# Patient Record
Sex: MALE | Race: WHITE | NOT HISPANIC OR LATINO | Employment: FULL TIME | URBAN - METROPOLITAN AREA
[De-identification: names, ages, dates, MRNs, and addresses within clinical notes are randomized per-mention and may not be internally consistent; named-entity substitution may affect disease eponyms.]

---

## 2023-02-24 ENCOUNTER — OFFICE VISIT (OUTPATIENT)
Dept: FAMILY MEDICINE | Facility: CLINIC | Age: 28
End: 2023-02-24
Payer: COMMERCIAL

## 2023-02-24 DIAGNOSIS — Z23 ENCOUNTER FOR IMMUNIZATION: Primary | ICD-10-CM

## 2023-02-24 PROCEDURE — 99207 PR NO CHARGE NURSE ONLY: CPT | Performed by: NURSE PRACTITIONER

## 2023-02-24 PROCEDURE — 90715 TDAP VACCINE 7 YRS/> IM: CPT | Performed by: NURSE PRACTITIONER

## 2023-02-24 PROCEDURE — 90471 IMMUNIZATION ADMIN: CPT | Performed by: NURSE PRACTITIONER

## 2023-02-24 NOTE — PROGRESS NOTES
patient does not want to see the provider today. Patient states has a physical set up in [place]. Only wants a Tdap-given  Answers for HPI/ROS submitted by the patient on 2/24/2023  What is the reason for your visit today? : I need the TDAP vaccine before my baby is born next month  How many servings of fruits and vegetables do you eat daily?: 2-3  On average, how many sweetened beverages do you drink each day (Examples: soda, juice, sweet tea, etc.  Do NOT count diet or artificially sweetened beverages)?: 0  How many minutes a day do you exercise enough to make your heart beat faster?: 20 to 29  How many days a week do you exercise enough to make your heart beat faster?: 4  How many days per week do you miss taking your medication?: 0    Nurse only visit

## 2023-04-20 ENCOUNTER — OFFICE VISIT (OUTPATIENT)
Dept: FAMILY MEDICINE | Facility: CLINIC | Age: 28
End: 2023-04-20
Payer: COMMERCIAL

## 2023-04-20 VITALS
RESPIRATION RATE: 18 BRPM | WEIGHT: 212.6 LBS | TEMPERATURE: 97.6 F | BODY MASS INDEX: 27.28 KG/M2 | SYSTOLIC BLOOD PRESSURE: 122 MMHG | HEIGHT: 74 IN | DIASTOLIC BLOOD PRESSURE: 72 MMHG | OXYGEN SATURATION: 100 % | HEART RATE: 60 BPM

## 2023-04-20 DIAGNOSIS — Z11.59 NEED FOR HEPATITIS C SCREENING TEST: ICD-10-CM

## 2023-04-20 DIAGNOSIS — Z11.4 SCREENING FOR HIV (HUMAN IMMUNODEFICIENCY VIRUS): ICD-10-CM

## 2023-04-20 DIAGNOSIS — Z00.00 ANNUAL PHYSICAL EXAM: Primary | ICD-10-CM

## 2023-04-20 DIAGNOSIS — N50.3 EPIDIDYMAL CYST: ICD-10-CM

## 2023-04-20 PROCEDURE — 99385 PREV VISIT NEW AGE 18-39: CPT | Performed by: FAMILY MEDICINE

## 2023-04-20 ASSESSMENT — ENCOUNTER SYMPTOMS
ABDOMINAL PAIN: 0
COUGH: 0
DYSURIA: 0
FEVER: 0
PARESTHESIAS: 0
CHILLS: 0
HEADACHES: 0
NERVOUS/ANXIOUS: 0
CONSTIPATION: 0
DIARRHEA: 0
ARTHRALGIAS: 0
SHORTNESS OF BREATH: 0
SORE THROAT: 0
HEMATOCHEZIA: 0
JOINT SWELLING: 0
FREQUENCY: 0
DIZZINESS: 0
MYALGIAS: 0
WEAKNESS: 0
EYE PAIN: 0
HEMATURIA: 0
NAUSEA: 0
HEARTBURN: 0
PALPITATIONS: 0

## 2023-04-20 ASSESSMENT — PAIN SCALES - GENERAL: PAINLEVEL: NO PAIN (0)

## 2023-04-20 NOTE — PROGRESS NOTES
SUBJECTIVE:   CC: Jake is an 27 year old who presents for preventative health visit.       4/20/2023    10:56 AM   Additional Questions   Roomed by Ginna OLVERA     Healthy Habits:     Getting at least 3 servings of Calcium per day:  Yes    Bi-annual eye exam:  NO    Dental care twice a year:  NO    Sleep apnea or symptoms of sleep apnea:  None    Diet:  Breakfast skipped    Frequency of exercise:  2-3 days/week    Duration of exercise:  15-30 minutes    Taking medications regularly:  Yes    Medication side effects:  None    PHQ-2 Total Score: 0    Additional concerns today:  No      History of epididymal cyst on the left.  Has been asymptomatic since that area.  Feels like it still there.  Reviewed imaging.        Today's PHQ-2 Score:       4/20/2023    10:26 AM   PHQ-2 ( 1999 [employer])   Q1: Little interest or pleasure in doing things 0   Q2: Feeling down, depressed or hopeless 0   PHQ-2 Score 0   Q1: Little interest or pleasure in doing things Not at all    Not at all   Q2: Feeling down, depressed or hopeless Not at all    Not at all   PHQ-2 Score 0    0       Have you ever done Advance Care Planning? (For example, a Health Directive, POLST, or a discussion with a medical provider or your loved ones about your wishes): No, advance care planning information given to patient to review.  Patient declined advance care planning discussion at this time.    Social History     Tobacco Use     Smoking status: Never     Passive exposure: Never     Smokeless tobacco: Never   Vaping Use     Vaping status: Never Used   Substance Use Topics     Alcohol use: Not on file             4/20/2023    10:26 AM   Alcohol Use   Prescreen: >3 drinks/day or >7 drinks/week? No       Last PSA: No results found for: PSA    Reviewed orders with patient. Reviewed health maintenance and updated orders accordingly -       Reviewed and updated as needed this visit by clinical staff   Tobacco  Allergies  Meds              Reviewed and updated as  "needed this visit by Provider                     Review of Systems   Constitutional: Negative for chills and fever.   HENT: Negative for congestion, ear pain, hearing loss and sore throat.    Eyes: Negative for pain and visual disturbance.   Respiratory: Negative for cough and shortness of breath.    Cardiovascular: Negative for chest pain, palpitations and peripheral edema.   Gastrointestinal: Negative for abdominal pain, constipation, diarrhea, heartburn, hematochezia and nausea.   Genitourinary: Negative for dysuria, frequency, genital sores, hematuria, impotence, penile discharge and urgency.   Musculoskeletal: Negative for arthralgias, joint swelling and myalgias.   Skin: Negative for rash.   Neurological: Negative for dizziness, weakness, headaches and paresthesias.   Psychiatric/Behavioral: Negative for mood changes. The patient is not nervous/anxious.          OBJECTIVE:   /72   Pulse 60   Temp 97.6  F (36.4  C) (Temporal)   Resp 18   Ht 1.887 m (6' 2.3\")   Wt 96.4 kg (212 lb 9.6 oz)   SpO2 100%   BMI 27.08 kg/m      Physical Exam  GENERAL: healthy, alert and no distress  NECK: no adenopathy, no asymmetry, masses, or scars and thyroid normal to palpation  RESP: lungs clear to auscultation - no rales, rhonchi or wheezes  CV: regular rate and rhythm, normal S1 S2, no S3 or S4, no murmur, click or rub, no peripheral edema and peripheral pulses strong  ABDOMEN: soft, nontender, no hepatosplenomegaly, no masses and bowel sounds normal   (male): normal male genitalia without lesions or urethral discharge, no hernia.  He continues to have a round slightly mobile epididymal head cyst on the left testy.  MS: no gross musculoskeletal defects noted, no edema    Diagnostic Test Results:  Labs reviewed in Epic    ASSESSMENT/PLAN:       ICD-10-CM    1. Annual physical exam  Z00.00 Lipid panel reflex to direct LDL Fasting      2. Screening for HIV (human immunodeficiency virus)  Z11.4       3. Need for " "hepatitis C screening test  Z11.59       4. Epididymal cyst  N50.3         Annual topics covered  Reviewed above topics.  Stable cyst.  No further intervention is needed.  Discussed the difference tween cyst and testicular cancer.        COUNSELING:   Reviewed preventive health counseling, as reflected in patient instructions      BMI:   Estimated body mass index is 27.08 kg/m  as calculated from the following:    Height as of this encounter: 1.887 m (6' 2.3\").    Weight as of this encounter: 96.4 kg (212 lb 9.6 oz).         He reports that he has never smoked. He has never been exposed to tobacco smoke. He has never used smokeless tobacco.        Lon Son MD  [facility]  "

## 2023-05-21 ENCOUNTER — HEALTH MAINTENANCE LETTER (OUTPATIENT)
Age: 28
End: 2023-05-21

## 2024-07-28 ENCOUNTER — HEALTH MAINTENANCE LETTER (OUTPATIENT)
Age: 29
End: 2024-07-28

## 2025-01-22 ENCOUNTER — TELEPHONE (OUTPATIENT)
Dept: FAMILY MEDICINE | Facility: CLINIC | Age: 30
End: 2025-01-22
Payer: COMMERCIAL

## 2025-01-22 NOTE — TELEPHONE ENCOUNTER
Reason for Call:  Appointment Request    Patient requesting this type of appt:  Preventive     Requested provider: No Ref-Primary, Physician    Reason patient unable to be scheduled: Not with their preferred provider    When does patient want to be seen/preferred time: Same day    Comments: patient called and is scheduled for Friday to see Adelaida Painter at [facility]  for a physical.    Patient would like to see provider today instead for same reason.    Provider approval needed.    Please contact patient.  Thank you.    Could we send this information to you in [employer] or would you prefer to receive a phone call?:   Patient would prefer a phone call   Okay to leave a detailed message?: Yes at Home number on file [phone] (home)    Call taken on 1/22/2025 at 8:47 AM by Evi Hunt

## 2025-01-22 NOTE — TELEPHONE ENCOUNTER
Called and lvm explaining that we are unfortunately able to accommodate his request for earlier physical today.

## 2025-06-10 ENCOUNTER — OFFICE VISIT (OUTPATIENT)
Dept: FAMILY MEDICINE | Facility: CLINIC | Age: 30
End: 2025-06-10
Payer: COMMERCIAL

## 2025-06-10 VITALS
TEMPERATURE: 98.4 F | HEART RATE: 60 BPM | DIASTOLIC BLOOD PRESSURE: 72 MMHG | HEIGHT: 76 IN | OXYGEN SATURATION: 98 % | BODY MASS INDEX: 26.35 KG/M2 | RESPIRATION RATE: 12 BRPM | WEIGHT: 216.4 LBS | SYSTOLIC BLOOD PRESSURE: 116 MMHG

## 2025-06-10 DIAGNOSIS — R43.0 ANOSMIA: ICD-10-CM

## 2025-06-10 DIAGNOSIS — Z00.00 ANNUAL PHYSICAL EXAM: ICD-10-CM

## 2025-06-10 DIAGNOSIS — J34.2 DEVIATED NASAL SEPTUM: ICD-10-CM

## 2025-06-10 DIAGNOSIS — Z11.59 NEED FOR HEPATITIS C SCREENING TEST: ICD-10-CM

## 2025-06-10 DIAGNOSIS — Z11.4 SCREENING FOR HIV (HUMAN IMMUNODEFICIENCY VIRUS): Primary | ICD-10-CM

## 2025-06-10 LAB
CHOLEST SERPL-MCNC: 180 MG/DL
FASTING STATUS PATIENT QL REPORTED: YES
HDLC SERPL-MCNC: 61 MG/DL
LDLC SERPL CALC-MCNC: 108 MG/DL
NONHDLC SERPL-MCNC: 119 MG/DL
TRIGL SERPL-MCNC: 53 MG/DL

## 2025-06-10 PROCEDURE — 99395 PREV VISIT EST AGE 18-39: CPT | Performed by: FAMILY MEDICINE

## 2025-06-10 PROCEDURE — 3078F DIAST BP <80 MM HG: CPT | Performed by: FAMILY MEDICINE

## 2025-06-10 PROCEDURE — 99213 OFFICE O/P EST LOW 20 MIN: CPT | Mod: 25 | Performed by: FAMILY MEDICINE

## 2025-06-10 PROCEDURE — 3074F SYST BP LT 130 MM HG: CPT | Performed by: FAMILY MEDICINE

## 2025-06-10 PROCEDURE — 80061 LIPID PANEL: CPT | Performed by: FAMILY MEDICINE

## 2025-06-10 PROCEDURE — 36415 COLL VENOUS BLD VENIPUNCTURE: CPT | Performed by: FAMILY MEDICINE

## 2025-06-10 PROCEDURE — 1126F AMNT PAIN NOTED NONE PRSNT: CPT | Performed by: FAMILY MEDICINE

## 2025-06-10 SDOH — HEALTH STABILITY: PHYSICAL HEALTH: ON AVERAGE, HOW MANY DAYS PER WEEK DO YOU ENGAGE IN MODERATE TO STRENUOUS EXERCISE (LIKE A BRISK WALK)?: 3 DAYS

## 2025-06-10 ASSESSMENT — SOCIAL DETERMINANTS OF HEALTH (SDOH): HOW OFTEN DO YOU GET TOGETHER WITH FRIENDS OR RELATIVES?: MORE THAN THREE TIMES A WEEK

## 2025-06-10 ASSESSMENT — PAIN SCALES - GENERAL: PAINLEVEL_OUTOF10: NO PAIN (0)

## 2025-06-10 NOTE — PROGRESS NOTES
Preventive Care Visit  [facility]  Lon Son MD, Family Medicine  Satya 10, 2025  {Provider  Link to SmartSet :618116}    {PROVIDER CHARTING PREFERENCE:121906}    Hayder Joseph is a 29 year old, presenting for the following:  Physical        6/10/2025     3:47 PM   Additional Questions   Roomed by Satish Dean          HPI    Bloody noses  Trouble breathing out of L  Can't  taste or smell well, been present since young  No trauma known          Advance Care Planning  {The storyboard will display whether the patient has ACP docs on file. Hover over the Code section in the storyboard to access the ACP documents. :036968}  Discussed advance care planning with patient; informed AVS has link to [employer].        6/10/2025   General Health   How would you rate your overall physical health? Good   Feel stress (tense, anxious, or unable to sleep) Not at all         6/10/2025   Nutrition   Three or more servings of calcium each day? Yes   Diet: Regular (no restrictions)   How many servings of fruit and vegetables per day? (!) 2-3   How many sweetened beverages each day? 0-1         6/10/2025   Exercise   Days per week of moderate/strenous exercise 3 days         6/10/2025   Social Factors   Frequency of gathering with friends or relatives More than three times a week   Worry food won't last until get money to buy more No   Food not last or not have enough money for food? No   Do you have housing? (Housing is defined as stable permanent housing and does not include staying outside in a car, in a tent, in an abandoned building, in an overnight shelter, or couch-surfing.) Yes   Are you worried about losing your housing? No   Lack of transportation? No   Unable to get utilities (heat,electricity)? No         6/10/2025   Dental   Dentist two times every year? Yes         Today's PHQ-2 Score:       6/10/2025     3:44 PM   PHQ-2 ( 1999 Pf[employer])   Q1: Little interest or pleasure  "in doing things 0   Q2: Feeling down, depressed or hopeless 0   PHQ-2 Score 0    Q1: Little interest or pleasure in doing things Not at all   Q2: Feeling down, depressed or hopeless Not at all   PHQ-2 Score 0       Patient-reported           6/10/2025   Substance Use   Alcohol more than 3/day or more than 7/wk No   Do you use any other substances recreationally? No     Social History     Tobacco Use    Smoking status: Never     Passive exposure: Never    Smokeless tobacco: Never   Vaping Use    Vaping status: Never Used     {Provider  If there are gaps in the social history shown above, please follow the link to update and then refresh the note Link to Social and Substance History :813481}        6/10/2025   One time HIV Screening   Previous HIV test? I don't know         6/10/2025   STI Screening   New sexual partner(s) since last STI/HIV test? No         6/10/2025   Contraception/Family Planning   Questions about contraception or family planning No     {Provider  REQUIRED FOR AWV Use the storyboard to review patient history, after sections have been marked as reviewed, refresh note to capture documentation:444259}   Reviewed and updated as needed this visit by Provider                          Review of Systems  Constitutional, HEENT, cardiovascular, pulmonary, gi and gu systems are negative, except as otherwise noted.     Objective    Exam  /72   Pulse 60   Temp 98.4  F (36.9  C) (Temporal)   Resp 12   Ht 1.92 m (6' 3.59\")   Wt 98.2 kg (216 lb 6.4 oz)   SpO2 98%   BMI 26.63 kg/m     Estimated body mass index is 26.63 kg/m  as calculated from the following:    Height as of this encounter: 1.92 m (6' 3.59\").    Weight as of this encounter: 98.2 kg (216 lb 6.4 oz).    Physical Exam  {Exam Choices (Optional):873833}        Signed Electronically by: Lon Son MD  {Email feedback regarding this note to [email or web address]   :871542}  " "body mass index is 26.63 kg/m  as calculated from the following:    Height as of this encounter: 1.92 m (6' 3.59\").    Weight as of this encounter: 98.2 kg (216 lb 6.4 oz).    Physical Exam  GENERAL: alert and no distress  NECK: no adenopathy, no asymmetry, masses, or scars  RESP: lungs clear to auscultation - no rales, rhonchi or wheezes  CV: regular rate and rhythm, normal S1 S2, no S3 or S4, no murmur, click or rub, no peripheral edema  ABDOMEN: soft, nontender, no hepatosplenomegaly, no masses and bowel sounds normal  MS: no gross musculoskeletal defects noted, no edema        Signed Electronically by: Lon Son MD    "

## 2025-06-11 ENCOUNTER — PATIENT OUTREACH (OUTPATIENT)
Dept: CARE COORDINATION | Facility: CLINIC | Age: 30
End: 2025-06-11

## 2025-06-18 ENCOUNTER — RESULTS FOLLOW-UP (OUTPATIENT)
Dept: FAMILY MEDICINE | Facility: CLINIC | Age: 30
End: 2025-06-18

## 2025-07-10 NOTE — PROGRESS NOTES
ENT Consultation    Jake Curiel who is a 30 year old male seen in consultation at the request of Dr. Lon Son.      History of Present Illness - Jake Curiel is a 30 year old male presents for evaluation of nasal obstruction and nosebleeds.  Patient denies any known history of nasal trauma.  Denies any known history of seasonal perennial environmental allergies.  He has tried antihistamines has tried saline and is even been using Afrin no spray off-and-on without any relief even from Afrin decongestant which would be the most powerful way to addressed potential nasal obstruction due to any kind of soft tissue related issues.  Sense of smell and taste are usually intact unless he is very congested and then smell gets diminished.  Left side is always worse than the right.  Patient does get frequent nosebleeds on the right side only.  Denies any history of bleeding disorders personal or family.  Denies any history of bleeding gums or easy bruising.          BP Readings from Last 1 Encounters:   07/14/25 118/70       BP noted to be well controlled today in office.     Jake IS NOT a smoker/uses chewing tobacco.      Past Medical History - History reviewed. No pertinent past medical history.    Current Medications - No current outpatient medications on file.    Allergies - No Known Allergies    Social History -   Social History     Socioeconomic History    Marital status:    Tobacco Use    Smoking status: Never     Passive exposure: Never    Smokeless tobacco: Never   Vaping Use    Vaping status: Never Used     Social Drivers of Health     Financial Resource Strain: Low Risk  (6/10/2025)    Financial Resource Strain     Within the past 12 months, have you or your family members you live with been unable to get utilities (heat, electricity) when it was really needed?: No   Food Insecurity: Low Risk  (6/10/2025)    Food Insecurity     Within the past 12 months, did you worry that your food would  "run out before you got money to buy more?: No     Within the past 12 months, did the food you bought just not last and you didn t have money to get more?: No   Transportation Needs: Low Risk  (6/10/2025)    Transportation Needs     Within the past 12 months, has lack of transportation kept you from medical appointments, getting your medicines, non-medical meetings or appointments, work, or from getting things that you need?: No   Physical Activity: Unknown (6/10/2025)    Exercise Vital Sign     Days of Exercise per Week: 3 days   Stress: No Stress Concern Present (6/10/2025)    [employer] - Occupational Stress Questionnaire     Feeling of Stress : Not at all   Social Connections: Unknown (6/10/2025)    Social Connection and Isolation Panel [NHANES]     Frequency of Social Gatherings with Friends and Family: More than three times a week   Interpersonal Safety: Low Risk  (6/10/2025)    Interpersonal Safety     Do you feel physically and emotionally safe where you currently live?: Yes     Within the past 12 months, have you been hit, slapped, kicked or otherwise physically hurt by someone?: No     Within the past 12 months, have you been humiliated or emotionally abused in other ways by your partner or ex-partner?: No   Housing Stability: Low Risk  (6/10/2025)    Housing Stability     Do you have housing? : Yes     Are you worried about losing your housing?: No       Family History -   Family History   Problem Relation Age of Onset    No Known Problems Mother     No Known Problems Father     No Known Problems Sister     No Known Problems Brother     No Known Problems Brother     Dementia Paternal Grandfather        Review of Systems - As per HPI and PMHx, otherwise review of system review of the head and neck negative. Otherwise 10+ review of system is negative    Physical Exam  /70   Temp 97.6  F (36.4  C) (Temporal)   Ht 1.92 m (6' 3.59\")   Wt 92.4 kg (203 lb 11.2 oz)   BMI " 25.06 kg/m    BMI: Body mass index is 25.06 kg/m .    General - The patient is well nourished and well developed, and appears to have good nutritional status.  Alert and oriented to person and place, answers questions and cooperates with examination appropriately.    SKIN - No suspicious lesions or rashes.  Respiration - No respiratory distress.  Head and Face - Normocephalic and atraumatic, with no gross asymmetry noted of the contour of the facial features.  The facial nerve is intact, with strong symmetric movements.    Voice and Breathing - The patient was breathing comfortably without the use of accessory muscles. The patients voice was clear and strong, and had appropriate pitch and quality.    Ears - Bilateral pinna and EACs with normal appearing overlying skin. Tympanic membrane intact with good mobility on pneumatic otoscopy bilaterally. Bony landmarks of the ossicular chain are normal. The tympanic membranes are normal in appearance. No retraction, perforation, or masses.  No fluid or purulence was seen in the external canal or the middle ear.     Eyes - Extraocular movements intact.  Sclera were not icteric or injected, conjunctiva were pink and moist.    Mouth - Examination of the oral cavity showed pink, healthy oral mucosa. No lesions or ulcerations noted.  The tongue was mobile and midline, and the dentition were in good condition.      Throat - The walls of the oropharynx were smooth, pink, moist, symmetric, and had no lesions or ulcerations.  The tonsillar pillars and soft palate were symmetric.  The uvula was midline on elevation.    Neck - Normal midline excursion of the laryngotracheal complex during swallowing.  Full range of motion on passive movement.  Palpation of the occipital, submental, submandibular, internal jugular chain, and supraclavicular nodes did not demonstrate any abnormal lymph nodes or masses.  The carotid pulse was palpable bilaterally.  Palpation of the thyroid was soft and  smooth, with no nodules or goiter appreciated.  The trachea was mobile and midline.    Nose - External contour is symmetric, no gross deflection or scars.  Nasal mucosa is pink and moist with no abnormal mucus.  The septum was straight deviated inferiorly to the left superiorly to the right.  Septum is obstructive, turbinates of large size and position.  No polyps, masses, or purulence noted on examination.  Significantly prominent anterior blood vessels noted on the right side of the septum.  Neuro - Nonfocal neuro exam is normal, CN 2 through 12 intact, normal gait and muscle tone.      Performed in clinic today:  To further evaluate the nasal cavity, I performed rigid nasal endoscopy.  I first sprayed the nasal cavity bilaterally with a mix of lidocaine and neosynephrine.  I then began on the left side using a 2.7mm, 30 degree rigid nasal endoscope.  The septum was deviated and the nasal airway was obstructed.  No abnormal secretions, purulence, or polyps were noted. The left middle turbinate and middle meatus were clearly visualized and normal in appearance.  Looking up, the olfactory cleft was unobstructed.  Going further back, the sphenoethmoid recess was normal in appearance, with healthy appearing mucosa on the face of the sphenoid.  The nasopharynx was unremarkable, and the eustachian tube opening on this side was unobstructed.    I then turned my attention to the right side.  Once again, the septum was deviated, and the airway was open.  No abnormal secretions, purulence, polyps were noted.  The right middle turbinate and middle meatus were clearly visualized and normal in appearance.  Looking up, the olfactory cleft was unobstructed.  Going further back the right sphenoethmoid recess was normal in appearance, and eustachian tube opening was unobstructed.   Red - [id number] Mytamed  At this point also has appreciated very prominent blood vessels on the right side anterior septum I first use rigid sinus  endoscope was the guide and then magnified speculum to methodically cauterized blood vessels using silver nitrate.  Patient tolerated the procedure well.    A/P - Jake Curiel is a 30 year old male patient with nasal obstruction due to deviated septum enlarged turbinates as well as history of epistaxis with a prominent blood vessels that were successfully cauterized today on the right side.  In regard to his nose he will continue by using Vaseline for the next week once or twice daily topically.  As far as nasal obstruction we discussed options.  Considering he did not did not respond to Afrin no spray we discussed septoplasty and submucous resection of turbinates.We discuss risks and possible complication of septoplasty including septal perforation, bleeding(that may require packing), infection, loss of smell, stenosis, CSF rhinorrhea. With this knowledge the patient wishes to proceed with the surgery.       Binh Gamble MD

## 2025-07-14 ENCOUNTER — PREP FOR PROCEDURE (OUTPATIENT)
Dept: OTOLARYNGOLOGY | Facility: CLINIC | Age: 30
End: 2025-07-14

## 2025-07-14 ENCOUNTER — TELEPHONE (OUTPATIENT)
Dept: OTOLARYNGOLOGY | Facility: CLINIC | Age: 30
End: 2025-07-14

## 2025-07-14 ENCOUNTER — OFFICE VISIT (OUTPATIENT)
Dept: OTOLARYNGOLOGY | Facility: CLINIC | Age: 30
End: 2025-07-14
Payer: COMMERCIAL

## 2025-07-14 VITALS
BODY MASS INDEX: 24.81 KG/M2 | TEMPERATURE: 97.6 F | WEIGHT: 203.7 LBS | HEIGHT: 76 IN | SYSTOLIC BLOOD PRESSURE: 118 MMHG | DIASTOLIC BLOOD PRESSURE: 70 MMHG

## 2025-07-14 DIAGNOSIS — J34.89 NASAL OBSTRUCTION: ICD-10-CM

## 2025-07-14 DIAGNOSIS — J34.2 DEVIATED NASAL SEPTUM: Primary | ICD-10-CM

## 2025-07-14 DIAGNOSIS — R04.0 EPISTAXIS: Primary | ICD-10-CM

## 2025-07-14 DIAGNOSIS — J34.2 DEVIATED NASAL SEPTUM: ICD-10-CM

## 2025-07-14 DIAGNOSIS — J34.3 HYPERTROPHY OF BOTH INFERIOR NASAL TURBINATES: ICD-10-CM

## 2025-07-14 PROCEDURE — 99204 OFFICE O/P NEW MOD 45 MIN: CPT | Mod: 25 | Performed by: OTOLARYNGOLOGY

## 2025-07-14 PROCEDURE — 30901 CONTROL OF NOSEBLEED: CPT | Mod: 59 | Performed by: OTOLARYNGOLOGY

## 2025-07-14 PROCEDURE — 31231 NASAL ENDOSCOPY DX: CPT | Mod: 52 | Performed by: OTOLARYNGOLOGY

## 2025-07-14 PROCEDURE — 3074F SYST BP LT 130 MM HG: CPT | Performed by: OTOLARYNGOLOGY

## 2025-07-14 PROCEDURE — 3078F DIAST BP <80 MM HG: CPT | Performed by: OTOLARYNGOLOGY

## 2025-07-14 NOTE — LETTER
7/14/2025      Jake Curiel  [address] [facility] [address]      Dear Colleague,    Thank you for referring your patient, Jake Curiel, to the [facility]. Please see a copy of my visit note below.    ENT Consultation    Jake Curiel who is a 30 year old male seen in consultation at the request of Dr. Lon Son.      History of Present Illness - Jake Curiel is a 30 year old male presents for evaluation of nasal obstruction and nosebleeds.  Patient denies any known history of nasal trauma.  Denies any known history of seasonal perennial environmental allergies.  He has tried antihistamines has tried saline and is even been using Afrin no spray off-and-on without any relief even from Afrin decongestant which would be the most powerful way to addressed potential nasal obstruction due to any kind of soft tissue related issues.  Sense of smell and taste are usually intact unless he is very congested and then smell gets diminished.  Left side is always worse than the right.  Patient does get frequent nosebleeds on the right side only.  Denies any history of bleeding disorders personal or family.  Denies any history of bleeding gums or easy bruising.          BP Readings from Last 1 Encounters:   07/14/25 118/70       BP noted to be well controlled today in office.     Jake IS NOT a smoker/uses chewing tobacco.      Past Medical History - History reviewed. No pertinent past medical history.    Current Medications - No current outpatient medications on file.    Allergies - No Known Allergies    Social History -   Social History     Socioeconomic History     Marital status:    Tobacco Use     Smoking status: Never     Passive exposure: Never     Smokeless tobacco: Never   Vaping Use     Vaping status: Never Used     Social Drivers of Health     Financial Resource Strain: Low Risk  (6/10/2025)    Financial Resource Strain      Within the past 12 months,  have you or your family members you live with been unable to get utilities (heat, electricity) when it was really needed?: No   Food Insecurity: Low Risk  (6/10/2025)    Food Insecurity      Within the past 12 months, did you worry that your food would run out before you got money to buy more?: No      Within the past 12 months, did the food you bought just not last and you didn t have money to get more?: No   Transportation Needs: Low Risk  (6/10/2025)    Transportation Needs      Within the past 12 months, has lack of transportation kept you from medical appointments, getting your medicines, non-medical meetings or appointments, work, or from getting things that you need?: No   Physical Activity: Unknown (6/10/2025)    Exercise Vital Sign      Days of Exercise per Week: 3 days   Stress: No Stress Concern Present (6/10/2025)    [employer] - Occupational Stress Questionnaire      Feeling of Stress : Not at all   Social Connections: Unknown (6/10/2025)    Social Connection and Isolation Panel [NHANES]      Frequency of Social Gatherings with Friends and Family: More than three times a week   Interpersonal Safety: Low Risk  (6/10/2025)    Interpersonal Safety      Do you feel physically and emotionally safe where you currently live?: Yes      Within the past 12 months, have you been hit, slapped, kicked or otherwise physically hurt by someone?: No      Within the past 12 months, have you been humiliated or emotionally abused in other ways by your partner or ex-partner?: No   Housing Stability: Low Risk  (6/10/2025)    Housing Stability      Do you have housing? : Yes      Are you worried about losing your housing?: No       Family History -   Family History   Problem Relation Age of Onset     No Known Problems Mother      No Known Problems Father      No Known Problems Sister      No Known Problems Brother      No Known Problems Brother      Dementia Paternal Grandfather        Review of  "Systems - As per HPI and PMHx, otherwise review of system review of the head and neck negative. Otherwise 10+ review of system is negative    Physical Exam  /70   Temp 97.6  F (36.4  C) (Temporal)   Ht 1.92 m (6' 3.59\")   Wt 92.4 kg (203 lb 11.2 oz)   BMI 25.06 kg/m    BMI: Body mass index is 25.06 kg/m .    General - The patient is well nourished and well developed, and appears to have good nutritional status.  Alert and oriented to person and place, answers questions and cooperates with examination appropriately.    SKIN - No suspicious lesions or rashes.  Respiration - No respiratory distress.  Head and Face - Normocephalic and atraumatic, with no gross asymmetry noted of the contour of the facial features.  The facial nerve is intact, with strong symmetric movements.    Voice and Breathing - The patient was breathing comfortably without the use of accessory muscles. The patients voice was clear and strong, and had appropriate pitch and quality.    Ears - Bilateral pinna and EACs with normal appearing overlying skin. Tympanic membrane intact with good mobility on pneumatic otoscopy bilaterally. Bony landmarks of the ossicular chain are normal. The tympanic membranes are normal in appearance. No retraction, perforation, or masses.  No fluid or purulence was seen in the external canal or the middle ear.     Eyes - Extraocular movements intact.  Sclera were not icteric or injected, conjunctiva were pink and moist.    Mouth - Examination of the oral cavity showed pink, healthy oral mucosa. No lesions or ulcerations noted.  The tongue was mobile and midline, and the dentition were in good condition.      Throat - The walls of the oropharynx were smooth, pink, moist, symmetric, and had no lesions or ulcerations.  The tonsillar pillars and soft palate were symmetric.  The uvula was midline on elevation.    Neck - Normal midline excursion of the laryngotracheal complex during swallowing.  Full range of motion " on passive movement.  Palpation of the occipital, submental, submandibular, internal jugular chain, and supraclavicular nodes did not demonstrate any abnormal lymph nodes or masses.  The carotid pulse was palpable bilaterally.  Palpation of the thyroid was soft and smooth, with no nodules or goiter appreciated.  The trachea was mobile and midline.    Nose - External contour is symmetric, no gross deflection or scars.  Nasal mucosa is pink and moist with no abnormal mucus.  The septum was straight deviated inferiorly to the left superiorly to the right.  Septum is obstructive, turbinates of large size and position.  No polyps, masses, or purulence noted on examination.  Significantly prominent anterior blood vessels noted on the right side of the septum.  Neuro - Nonfocal neuro exam is normal, CN 2 through 12 intact, normal gait and muscle tone.      Performed in clinic today:  To further evaluate the nasal cavity, I performed rigid nasal endoscopy.  I first sprayed the nasal cavity bilaterally with a mix of lidocaine and neosynephrine.  I then began on the left side using a 2.7mm, 30 degree rigid nasal endoscope.  The septum was deviated and the nasal airway was obstructed.  No abnormal secretions, purulence, or polyps were noted. The left middle turbinate and middle meatus were clearly visualized and normal in appearance.  Looking up, the olfactory cleft was unobstructed.  Going further back, the sphenoethmoid recess was normal in appearance, with healthy appearing mucosa on the face of the sphenoid.  The nasopharynx was unremarkable, and the eustachian tube opening on this side was unobstructed.    I then turned my attention to the right side.  Once again, the septum was deviated, and the airway was open.  No abnormal secretions, purulence, polyps were noted.  The right middle turbinate and middle meatus were clearly visualized and normal in appearance.  Looking up, the olfactory cleft was unobstructed.  Going  further back the right sphenoethmoid recess was normal in appearance, and eustachian tube opening was unobstructed.   Red - [id number] Mytamed  At this point also has appreciated very prominent blood vessels on the right side anterior septum I first use rigid sinus endoscope was the guide and then magnified speculum to methodically cauterized blood vessels using silver nitrate.  Patient tolerated the procedure well.    A/P - Jake Curiel is a 30 year old male patient with nasal obstruction due to deviated septum enlarged turbinates as well as history of epistaxis with a prominent blood vessels that were successfully cauterized today on the right side.  In regard to his nose he will continue by using Vaseline for the next week once or twice daily topically.  As far as nasal obstruction we discussed options.  Considering he did not did not respond to Afrin no spray we discussed septoplasty and submucous resection of turbinates.We discuss risks and possible complication of septoplasty including septal perforation, bleeding(that may require packing), infection, loss of smell, stenosis, CSF rhinorrhea. With this knowledge the patient wishes to proceed with the surgery.       Binh Gamble MD     Again, thank you for allowing me to participate in the care of your patient.        Sincerely,        Binh Gamble MD, MD    Electronically signed

## 2025-07-14 NOTE — TELEPHONE ENCOUNTER
Type of surgery: SEPTOPLASTY, NOSE and submucous resection of turbinates bilateral   Location of surgery: [facility]  Date and time of surgery: 9/9/25  Surgeon: Mavis  Pre-Op Appt Date: 8/14  Post-Op Appt Date: 9/15 (wants a Monday)   Packet sent out: Yes  Pre-cert/Authorization completed:  Not Applicable  Date: na

## 2025-08-10 ENCOUNTER — HEALTH MAINTENANCE LETTER (OUTPATIENT)
Age: 30
End: 2025-08-10

## 2025-08-24 PROBLEM — R43.0 ANOSMIA: Status: ACTIVE | Noted: 2025-08-24

## 2025-08-24 PROBLEM — J34.2 DEVIATED NASAL SEPTUM: Status: ACTIVE | Noted: 2025-08-24
